# Patient Record
Sex: FEMALE | Race: WHITE | Employment: FULL TIME | URBAN - METROPOLITAN AREA
[De-identification: names, ages, dates, MRNs, and addresses within clinical notes are randomized per-mention and may not be internally consistent; named-entity substitution may affect disease eponyms.]

---

## 2022-02-02 ENCOUNTER — TRANSCRIBE ORDERS (OUTPATIENT)
Dept: MATERNAL FETAL MEDICINE | Facility: CLINIC | Age: 32
End: 2022-02-02
Payer: COMMERCIAL

## 2022-02-02 DIAGNOSIS — O26.90 PREGNANCY RELATED CONDITION: Primary | ICD-10-CM

## 2022-02-03 ENCOUNTER — PRE VISIT (OUTPATIENT)
Dept: MATERNAL FETAL MEDICINE | Facility: CLINIC | Age: 32
End: 2022-02-03
Payer: COMMERCIAL

## 2022-02-11 ENCOUNTER — HOSPITAL ENCOUNTER (OUTPATIENT)
Dept: ULTRASOUND IMAGING | Facility: CLINIC | Age: 32
End: 2022-02-11
Attending: OBSTETRICS & GYNECOLOGY
Payer: COMMERCIAL

## 2022-02-11 ENCOUNTER — OFFICE VISIT (OUTPATIENT)
Dept: MATERNAL FETAL MEDICINE | Facility: CLINIC | Age: 32
End: 2022-02-11
Attending: OBSTETRICS & GYNECOLOGY
Payer: COMMERCIAL

## 2022-02-11 DIAGNOSIS — Z36.89 ENCOUNTER FOR FETAL ANATOMIC SURVEY: Primary | ICD-10-CM

## 2022-02-11 DIAGNOSIS — O26.90 PREGNANCY RELATED CONDITION: ICD-10-CM

## 2022-02-11 PROCEDURE — 76811 OB US DETAILED SNGL FETUS: CPT

## 2022-02-11 PROCEDURE — 76811 OB US DETAILED SNGL FETUS: CPT | Mod: 26 | Performed by: OBSTETRICS & GYNECOLOGY

## 2022-02-11 NOTE — PROGRESS NOTES
"Please see \"Imaging\" tab under \"Chart Review\" for details of today's US.    Fozia Dixon, DO    "